# Patient Record
Sex: FEMALE | Race: WHITE | NOT HISPANIC OR LATINO | URBAN - METROPOLITAN AREA
[De-identification: names, ages, dates, MRNs, and addresses within clinical notes are randomized per-mention and may not be internally consistent; named-entity substitution may affect disease eponyms.]

---

## 2023-05-28 ENCOUNTER — EMERGENCY (EMERGENCY)
Facility: HOSPITAL | Age: 48
LOS: 1 days | Discharge: HOME | End: 2023-05-28
Admitting: STUDENT IN AN ORGANIZED HEALTH CARE EDUCATION/TRAINING PROGRAM
Payer: COMMERCIAL

## 2023-05-28 VITALS
HEART RATE: 69 BPM | RESPIRATION RATE: 18 BRPM | DIASTOLIC BLOOD PRESSURE: 91 MMHG | OXYGEN SATURATION: 98 % | TEMPERATURE: 98 F | SYSTOLIC BLOOD PRESSURE: 136 MMHG

## 2023-05-28 PROCEDURE — 99283 EMERGENCY DEPT VISIT LOW MDM: CPT

## 2023-05-28 PROCEDURE — 96372 THER/PROPH/DIAG INJ SC/IM: CPT

## 2023-05-28 PROCEDURE — 99283 EMERGENCY DEPT VISIT LOW MDM: CPT | Mod: 25

## 2023-05-28 RX ORDER — ACETAMINOPHEN 500 MG
975 TABLET ORAL ONCE
Refills: 0 | Status: COMPLETED | OUTPATIENT
Start: 2023-05-28 | End: 2023-05-28

## 2023-05-28 RX ORDER — AMOXICILLIN 250 MG/5ML
1 SUSPENSION, RECONSTITUTED, ORAL (ML) ORAL
Qty: 15 | Refills: 0
Start: 2023-05-28 | End: 2023-06-01

## 2023-05-28 RX ORDER — KETOROLAC TROMETHAMINE 30 MG/ML
15 SYRINGE (ML) INJECTION ONCE
Refills: 0 | Status: DISCONTINUED | OUTPATIENT
Start: 2023-05-28 | End: 2023-05-28

## 2023-05-28 RX ORDER — AMOXICILLIN 250 MG/5ML
500 SUSPENSION, RECONSTITUTED, ORAL (ML) ORAL ONCE
Refills: 0 | Status: COMPLETED | OUTPATIENT
Start: 2023-05-28 | End: 2023-05-28

## 2023-05-28 RX ADMIN — Medication 500 MILLIGRAM(S): at 00:24

## 2023-05-28 RX ADMIN — Medication 15 MILLIGRAM(S): at 00:25

## 2023-05-28 RX ADMIN — Medication 975 MILLIGRAM(S): at 00:25

## 2023-05-28 NOTE — ED PROVIDER NOTE - PATIENT PORTAL LINK FT
You can access the FollowMyHealth Patient Portal offered by [facility] by registering at the following website: [email or web address]. By joining [facility]’s FollowMyHealth portal, you will also be able to view your health information using other applications (apps) compatible with our system.

## 2023-05-28 NOTE — ED ADULT NURSE NOTE - NSFALLUNIVINTERV_ED_ALL_ED
Bed/Stretcher in lowest position, wheels locked, appropriate side rails in place/Call bell, personal items and telephone in reach/Instruct patient to call for assistance before getting out of bed/chair/stretcher/Non-slip footwear applied when patient is off stretcher/[place] to call system/Physically safe environment - no spills, clutter or unnecessary equipment/Purposeful proactive rounding/Room/bathroom lighting operational, light cord in reach

## 2023-05-28 NOTE — ED ADULT TRIAGE NOTE - TEMPERATURE IN CELSIUS (DEGREES C)
36.6 Quality 130: Documentation Of Current Medications In The Medical Record: Current Medications Documented Quality 110: Preventive Care And Screening: Influenza Immunization: Influenza Immunization Administered during Influenza season Quality 226: Preventive Care And Screening: Tobacco Use: Screening And Cessation Intervention: Patient screened for tobacco and never smoked Detail Level: Detailed Quality 131: Pain Assessment And Follow-Up: Pain assessment using a standardized tool is documented as negative, no follow-up plan required Quality 431: Preventive Care And Screening: Unhealthy Alcohol Use - Screening: Patient screened for unhealthy alcohol use using a single question and scores less than 2 times per year

## 2023-05-28 NOTE — ED ADULT NURSE NOTE - NS ED PATIENT SAFETY CONCERN
Sudden onset of generalized abdominal pain at 12 noon yesterday. Temp 99. Mom states child runs to bathroom frequently \"like he's going to vomit or have diarrhea but nothing happens\". Last BM today and \"normal\" per mom. Pain comes in waves.  Child cryin No

## 2023-05-28 NOTE — ED PROVIDER NOTE - PHYSICAL EXAMINATION
Constitutional : Well appearing, non-toxic, no acute distress. awake, alert, oriented to person, place, time/situation.  Head : head normocephalic, atraumatic  EENMT : eyes clear bilaterally, PERRL, EOMI. airway patent. moist mucous membranes. neck supple.  left lower molar tenderness over tooth. no redness, swelling to gum, no tenderness. no abscess seen. oropharynx non-injected. bilateral TM clear. no tenderness over mastoid.   Cardiac : Extremities warm and well perfused. 2+ radial and DP pulses. cap refill <2 seconds. no LE edema.  Resp : Respirations even and unlabored.   MSK :  range of motion is not limited, no muscle or joint tenderness  Back : No evidence of trauma. No spinal or CVA tenderness.  Neuro : Alert and oriented, CNII-XII grossly intact, no focal deficits, no motor or sensory deficits.  Skin : Skin normal color for race, warm, dry and intact. No evidence of rash.  Psych : Alert and oriented to person, place, time/situation. normal mood and affect. no apparent risk to self or others. Constitutional : Well appearing, non-toxic, no acute distress. awake, alert, oriented to person, place, time/situation.  Head : head normocephalic, atraumatic  EENMT : eyes clear bilaterally, PERRL, EOMI. airway patent. moist mucous membranes. neck supple.  left lower molar tenderness over tooth. no redness, swelling to gum, no tenderness. no abscess seen. tolerating secretions, no trismus. oropharynx non-injected. bilateral TM clear. no tenderness over mastoid.   Cardiac : Extremities warm and well perfused. 2+ radial and DP pulses. cap refill <2 seconds. no LE edema.  Resp : Respirations even and unlabored.   MSK :  range of motion is not limited, no muscle or joint tenderness  Back : No evidence of trauma. No spinal or CVA tenderness.  Neuro : Alert and oriented, CNII-XII grossly intact, no focal deficits, no motor or sensory deficits.  Skin : Skin normal color for race, warm, dry and intact. No evidence of rash.  Psych : Alert and oriented to person, place, time/situation. normal mood and affect. no apparent risk to self or others.

## 2023-05-28 NOTE — ED PROVIDER NOTE - NSFOLLOWUPINSTRUCTIONS_ED_ALL_ED_FT
TOOTHACHE    GENERAL INSTRUCTIONS - Try not to chew on the affected side of your mouth. You may eat and drink normally but after all meals/food be sure to gargle and rinse mouth with one large cup of warm salt water.    MEDICATIONS -  -- ANTIBIOTICS - Take amoxicillin 500mg one tab 3 times a day for 5 days to prevent infection.    -- PAIN MEDICATION - as needed for symptoms - For symptoms take Motrin and tylenol - THESE MEDICATIONS DO NOT REQUIRE A PRESCRIPTION AND CAN BE PURCHASED IN ANY PHARMACY. Take motrin 600mg every 6 hours as needed, take with food - discontinue use if you notice abdominal pain, blood in stool or black stools. AND / OR... Take tylenol as needed - 650mg every 6 hours as needed, do not exceed 4000mg in 24 hours. You can take one medication or the other. Or, if one medication alone does not seem to be working, you can actually take both tylenol and motrin together and that is acceptable / safe but you should alternate the medications so you are taking something every few hours; consider the following regimen - Take one medication every 3 hours. 7am motrin with breakfast, 10am tylenol, 1pm motirn with lunch, 4pm tylenol, 7pm motrin with dinner, 10pm tylneol before bed.    FOLLOW UP - Follow up with your dentist next week. if you are unable to make an appointment see below for resources.     RETURN PRECAUTIONS - Return to the Emergency Department if you notice redness/warmth/swelling/severe pain around the tooth/cheek/jaw, if you cannot open your mouth, if you develop a fever/chills, nausea/vomiting, or any other serious concerns. I have initiated a face to face evaluation of this patient and attest to the following.    ______      -- [facility] - [address] ([address]), [phone] Mon to Fri, open at 8am, clinic is located on the 5th floor, [address]  -- [facility] - [address], [place] [address] ([address]) - General phone number: [phone]. If it is an evening/night/weekend call the emergency dentistry & surgery number: [phone] ([phone]) or email [email or web address]. There is someone on call 24-7.  -- Alternatively, you may follow up with [facility] at [facility] and [address]. You may seek assistance with your Medicaid coverage in their Medicaid Office and you may obtain a Clinic Card which will give you access to [facility]'s primary care and specialty clinics. You can make those appointments on site or call [phone] to make an appointment.

## 2023-05-28 NOTE — ED PROVIDER NOTE - OBJECTIVE STATEMENT
48 yr old female, hx depression, presents to the Emergency Department w tooth pain. pt initially had pain to left lower tooth three days ago. mild pain. taking ibuprofen w some relief. tonight pain got significantly worse. sharp / stabbing pain going towards jaw. took dose of old amoxicillin pta. no pain or difficulty swallowing. no fever, chills, nausea, vomiting. no hx of similar. has dentist to follow up with after the holiday weekend.

## 2023-05-28 NOTE — ED ADULT NURSE NOTE - OBJECTIVE STATEMENT
Pt presented to the ED with complaints of toothache that started 3 days ago. As per pt, toothache progressively worsen today, has appointment scheduled for Tuesday to see a dentist.

## 2023-05-28 NOTE — ED PROVIDER NOTE - CLINICAL SUMMARY MEDICAL DECISION MAKING FREE TEXT BOX
here w acute worsening here w left lower molar pain x few days. tonight pain not controlled. no infectious symptoms. no dental injuries.   pt well appearing, stable vitals, left lower molar tenderness over tooth. no redness, swelling to gum, no abscess seen. tolerating secretions, no trismus.   dental pain w/o evidence of infection on exam  will start amox in case early infection   f/u w dental that pt already has established  otherwise will treat symptomatically. ok for dc.

## 2023-05-29 DIAGNOSIS — K08.89 OTHER SPECIFIED DISORDERS OF TEETH AND SUPPORTING STRUCTURES: ICD-10-CM

## 2023-05-29 DIAGNOSIS — F32.A DEPRESSION, UNSPECIFIED: ICD-10-CM
